# Patient Record
Sex: MALE | Race: WHITE | NOT HISPANIC OR LATINO | Employment: FULL TIME | URBAN - METROPOLITAN AREA
[De-identification: names, ages, dates, MRNs, and addresses within clinical notes are randomized per-mention and may not be internally consistent; named-entity substitution may affect disease eponyms.]

---

## 2023-10-06 PROBLEM — M31.6 GIANT CELL ARTERITIS (MULTI): Status: ACTIVE | Noted: 2023-10-06

## 2023-10-06 RX ORDER — ACETAMINOPHEN 160 MG/5ML
SUSPENSION, ORAL (FINAL DOSE FORM) ORAL
COMMUNITY
Start: 2017-12-06

## 2023-10-06 RX ORDER — AMLODIPINE BESYLATE 10 MG/1
1 TABLET ORAL DAILY
COMMUNITY
Start: 2017-01-04

## 2023-10-06 RX ORDER — OMEGA-3-ACID ETHYL ESTERS 1 G/1
1 CAPSULE, LIQUID FILLED ORAL 2 TIMES DAILY
COMMUNITY
Start: 2016-12-27

## 2023-10-06 RX ORDER — FUROSEMIDE 80 MG/1
1 TABLET ORAL DAILY
COMMUNITY
Start: 2016-09-28

## 2023-10-06 RX ORDER — LABETALOL 300 MG/1
1 TABLET, FILM COATED ORAL EVERY 12 HOURS
COMMUNITY
Start: 2016-11-07

## 2023-10-06 RX ORDER — LISINOPRIL 40 MG/1
1 TABLET ORAL 2 TIMES DAILY
COMMUNITY
Start: 2016-10-26

## 2023-10-06 RX ORDER — CLONIDINE HYDROCHLORIDE 0.3 MG/1
1 TABLET ORAL 2 TIMES DAILY
COMMUNITY
Start: 2016-12-09

## 2023-10-06 RX ORDER — CLOBETASOL PROPIONATE 0.5 MG/G
OINTMENT TOPICAL DAILY
COMMUNITY
Start: 2016-09-02

## 2023-10-06 RX ORDER — MULTIVITAMIN
TABLET ORAL
COMMUNITY

## 2023-10-06 RX ORDER — ROSUVASTATIN CALCIUM 20 MG/1
TABLET, COATED ORAL
COMMUNITY

## 2023-10-06 RX ORDER — SERTRALINE HYDROCHLORIDE 50 MG/1
1 TABLET, FILM COATED ORAL DAILY
COMMUNITY
Start: 2016-11-21

## 2023-10-06 RX ORDER — NAPROXEN SODIUM 220 MG/1
TABLET, FILM COATED ORAL
COMMUNITY
Start: 2017-12-06

## 2023-10-06 RX ORDER — FENOFIBRIC ACID 135 MG/1
1 CAPSULE, DELAYED RELEASE ORAL DAILY
COMMUNITY
Start: 2017-01-05

## 2023-10-09 ENCOUNTER — OFFICE VISIT (OUTPATIENT)
Dept: ORTHOPEDIC SURGERY | Facility: CLINIC | Age: 73
End: 2023-10-09
Payer: COMMERCIAL

## 2023-10-09 DIAGNOSIS — M54.59 DISCOGENIC LUMBAR PAIN: ICD-10-CM

## 2023-10-09 DIAGNOSIS — M47.816 LUMBAR SPONDYLOSIS: Primary | ICD-10-CM

## 2023-10-09 PROCEDURE — 99203 OFFICE O/P NEW LOW 30 MIN: CPT | Performed by: PHYSICAL MEDICINE & REHABILITATION

## 2023-10-09 PROCEDURE — 1036F TOBACCO NON-USER: CPT | Performed by: PHYSICAL MEDICINE & REHABILITATION

## 2023-10-09 PROCEDURE — 1160F RVW MEDS BY RX/DR IN RCRD: CPT | Performed by: PHYSICAL MEDICINE & REHABILITATION

## 2023-10-09 PROCEDURE — 1159F MED LIST DOCD IN RCRD: CPT | Performed by: PHYSICAL MEDICINE & REHABILITATION

## 2023-10-09 NOTE — PROGRESS NOTES
"10/9/2023     Assessment: Very pleasant 73-year-old male with acute on chronic primarily left lower back pain.  Seems to have had an exacerbation of pain after doing house projects.  Fortunately over the past couple weeks he has seen improvement.  He denies any worsening neurological deficits  -Suspect exacerbation of lumbar spondylosis and possible discogenic pain  -History of CKD-caution with medications  -History of prostate as well as renal cancer.  Patient states both are \"under control\"    PLAN:  1)  Imaging/Diagnostic Studies: Reviewed pelvic x-ray which she had on his tablet.  He deferred further imaging today.  We will obtain updated lumbar x-ray and MRI if symptoms persist or worsen  2)  Therapy/Rehabilitation: New consult provided for physical therapy  3)  Pharmacological Management: Educated over-the-counter Tylenol usage.  4)  Spine/Surgical Interventions: None at this time  5)  Alternative Treatments: May consider alternative treatment options in the future including manipulation (chiropractor versus osteopathic) and/or acupuncture if patient does not obtain optimal relief with initial treatment plan.  6)  Consultations: Physical therapy  7)  Follow -up: 4-6 weeks or PRN if symptoms worsen/do not improve.   8)  Future treatment considerations: Further imaging and medications if needed    Patient advised of the difference between hurt and harm and advised to continue with all normal activities and exercises. Patient verbalized understanding of the above plan and was happy with the care provided.      The above clinical summary has been dictated with voice recognition software. It has not been proofread for grammatical errors, typographical mistakes, or other semantic inconsistencies.    Thank you for visiting our office today. It was our pleasure to take part in your healthcare.     Do not hesitate to call with any questions regarding your plan of care after leaving at [phone]    To clinicians, " thank you very much for this kind referral. It is a privilege to partner with you in the care of your patients. My office would be delighted to assist you with any further consultations or with questions regarding the plan of care outlined. Do not hesitate to call the office or contact me directly.     Sincerely,    MERY Roa MD  , Physical Medicine and Rehabilitation, Orthopedic Spine  [facility] [employer] [facility]  [facility]         Miah Blanton   is a 73 y.o. male who presents with chronic lower back pain.  Recent exacerbation which has been improving.  Hx of CKD, prostate and renal cancer - currently in remission.  Notes hx of pelvic fracture as a child.    Location:  Main area of pain is currently at the left lower back and buttock  Radiation: Left buttock, posterior thigh and into the lower leg and ankle.  No worsening numbness or weakness.  Notes intermittent cramping.    Quality: cramp, burning   current 0/10,  at its worst 8-9/10.  Not worse than a 2-3 in the past couple weeks.    Exacerbated by forward flexion and lifting   Relieved by time, walking  Onset, traumatic event: no recent, was working in his yard building a retaining wall.    Has tried:  Tylenol, back brace, chiro care.      Valsalva sign is neg  Grocery cart sign is neg  Smoker:  no  Does not wake them at night  Litigation: no    Patient denies bowel/bladder incontinence, denies fever, denies unintentional weight loss, denies clumsiness of hands, feet, or dropping things.  Denies any constitutional or myelopathic symptomatology.      PREVIOUS TREATMENTS  IN THE LAST SIX MONTHS     Active conservative therapy  in the last six months (see below)              1. Physical therapy:  no                                                                                2. Home exercise program after PT:  no                                                    3. A physician  supervised home exercise program (HEP):  no               4. Chiropractic Care:   yes                                                                   Passive conservative therapy  in the last six months (see below)              1. NSAIDS:   advil, avoiding due to CKD                                                                                                        2. Prescription pain medication:     no                                                         3. Acupuncture:    no                                                                                         4. Tens unit: no     Assistive Devices: none    Work status: instructor at [employer]      ROS: Other than listed in HPI, PMHX below, and intake paperwork including a 30 point patient-recorded review of symptoms which was personally reviewed and inclusive of no history of unintentional weight loss, change in appetite, significant malaise, fevers, chills, or change in bowel/bladder, shortness of breath, or chest pain.    I have confirmed and edited as necessary Past Medical, Past Surgical, Family, Social History and ROS as obtained by others. These were also obtained on new patient forms.      PHYSICAL EXAM:   GENERAL APPEARANCE:  Well nourished, well developed, and no apparent distress.  NEURO PSYCH: Patient oriented to person, place, Mood pleasant. Benign affect.  MUSCULOSKELETAL and NEUROLOGICAL       VISUAL INSPECTION           LUMBAR: WNL  SPINE ROM:   LUMBAR ROM: Full with mild pain at endrange flexion and extension      PALPATION:           SPINOUS PROCESS: Nontender lumbar           PARASPINALS: Mild tenderness bilateral lower lumbar, left worse than right  FACET LOADING: Mildly positive left lower lumbar  MUSCLE BULK: Normal and symmetrical in the upper & lower extremities.  MUSCLE TONE: Normal  MOTOR: 5/5 in all muscle groups tested in bilateral lower extremities   SENSORY: Normal sensory exam to light touch  GAIT: Mildly antalgic but steady  without the use of assistive device.  Able to go up on heels and toes that weakness  REFLEXES: +1 to bilateral L extremities  STRAIGHT LEG TEST: Negative on the left  PERIPHERAL JOINT ROM:   HIP ROM: Gluteal tightness as well as hamstring tightness  DANIA/Thigh Thrust/Compression/Jessi Finger:  Negative bilaterally   Hip Exam including thigh thrust and LOG ROLL: Negative bilaterally      DATA REVIEW:   The below imaging studies were personally reviewed and discussed with the patient.    Medical Decision Making:  The above note constitutes a Moderate to High level of medical decision making based on past data and imaging review, new and chronic symptoms with exacerbation, change in weakness or sensation, new imaging and diagnostic studies ordered, discussion of potential interventional or surgical treatment options, acute or chronic pain that may pose a threat to bodily function.    No past medical history on file.    Medication Documentation Review Audit    **Prior to Admission medications have not yet been reviewed**         No Known Allergies    Social History     Socioeconomic History    Marital status:      Spouse name: Not on file    Number of children: Not on file    Years of education: Not on file    Highest education level: Not on file   Occupational History    Not on file   Tobacco Use    Smoking status: Not on file    Smokeless tobacco: Not on file   Substance and Sexual Activity    Alcohol use: Not on file    Drug use: Not on file    Sexual activity: Not on file   Other Topics Concern    Not on file   Social History Narrative    Not on file     Social Determinants of Health     Financial Resource Strain: Not on file   Food Insecurity: Not on file   Transportation Needs: Not on file   Physical Activity: Not on file   Stress: Not on file   Social Connections: Not on file   Intimate Partner Violence: Not on file   Housing Stability: Not on file       No past surgical history on file.

## 2024-09-21 ENCOUNTER — OFFICE VISIT (OUTPATIENT)
Dept: URGENT CARE | Age: 74
End: 2024-09-21
Payer: COMMERCIAL

## 2024-09-21 VITALS
HEART RATE: 69 BPM | OXYGEN SATURATION: 95 % | RESPIRATION RATE: 16 BRPM | DIASTOLIC BLOOD PRESSURE: 78 MMHG | SYSTOLIC BLOOD PRESSURE: 132 MMHG | TEMPERATURE: 99.1 F

## 2024-09-21 DIAGNOSIS — R05.9 COUGH, UNSPECIFIED TYPE: ICD-10-CM

## 2024-09-21 DIAGNOSIS — U07.1 COVID: Primary | ICD-10-CM

## 2024-09-21 LAB — POC SARS-COV-2 AG BINAX: ABNORMAL

## 2024-09-21 RX ORDER — BENZONATATE 200 MG/1
200 CAPSULE ORAL 3 TIMES DAILY PRN
Qty: 42 CAPSULE | Refills: 0 | Status: SHIPPED | OUTPATIENT
Start: 2024-09-21 | End: 2025-03-20

## 2024-09-21 RX ORDER — METHYLPREDNISOLONE 4 MG/1
TABLET ORAL
Qty: 21 TABLET | Refills: 0 | Status: SHIPPED | OUTPATIENT
Start: 2024-09-21

## 2024-09-21 ASSESSMENT — ENCOUNTER SYMPTOMS
WHEEZING: 0
CHEST TIGHTNESS: 1
SINUS PRESSURE: 0
ACTIVITY CHANGE: 1
COUGH: 1
FATIGUE: 1
SHORTNESS OF BREATH: 1
SORE THROAT: 0
FEVER: 1
CHILLS: 1

## 2024-09-21 NOTE — PATIENT INSTRUCTIONS
Adhere to CDC guidelines and stay home for at least 2-5 days and isolate from others in your home.   You are likely most infectious during these first 5 days, but new guidelines state if you are fever free w/o fever reducing medications you can go out in public with a tight fitting mask for at least 5 days   Wear a high-quality mask if you must be around others at home and in public.   Do not go places where you are unable to wear a mask. For travel guidance, see CDC’s Travel webpage.   Do not travel.   Stay home and separate from others as much as possible.   Use a separate bathroom, if possible.   Take steps to improve ventilation at home, if possible.   Don’t share personal household items, like cups, towels, and utensils.   Monitor your symptoms. If you have an emergency warning sign (like trouble breathing), seek emergency medical care immediately.     Learn more about what to do if you have COVID-19.   You do not need to retest after testing positive. Instead, you may end your isolation after five days if you feel better and are fever-free for 24 hours without using a fever-reducing medicine. You are most infectious during those five days, but the CDC advises wearing a high-quality mask through day 10.5   Still, if you decide to retest, a rapid antigen test will likely yield the most accurate results for ending your isolation.   You may need to test again within three months of a positive COVID-19 test, such as for travel. The CDC advises using a rapid antigen test instead of a PCR test if it's been less than three months since you had COVID-19.2        Managing COVID-19 symptoms        While recovering at home, keep track of your symptoms. Follow your provider's instructions and take medicines as prescribed. If you have severe symptoms, call 911 or the local emergency number.     To help manage symptoms of COVID-19, try the following tips.     Rest and drink plenty of fluids.     Acetaminophen (Tylenol) and  ibuprofen (Advil, Motrin) help reduce fever. Sometimes, providers advise you to use both types of medicine. Take the recommended amount to reduce fever. DO NOT use ibuprofen in children 6 months or younger.     A lukewarm bath or sponge bath may help cool a fever. Keep taking medicine -- otherwise your temperature might go back up.     For a sore throat, gargle several times a day with warm salt water (1/2 tsp or 3 grams of salt in 1 cup or 240 milliliters of water). Drink warm liquids such as tea, or lemon tea with honey. Suck on hard candies or throat lozenges.     Use a vaporizer or take a steamy shower to increase moisture in the air, reduce nasal congestion, and help soothe a dry throat and cough.     Saline spray can also help reduce nasal congestion.     To help relieve diarrhea, drink 8 to 10 glasses of clear liquids, such as water, diluted fruit juices, and clear soups to make up for fluid loss. Avoid dairy products, fried foods, caffeine, alcohol, and carbonated drinks.     If you have nausea, eat small meals with bland foods. Avoid foods with strong smells. Try to drink 8 to 10 glasses of water or clear fluids every day to stay hydrated.     Do not smoke, and stay away from secondhand smoke.      HOME CARE INSTRUCTIONS:     --- Drink plenty of water. Water helps thin the mucus so your sinuses can drain more easily.     --- Use a humidifier.     --- Inhale steam 3 to 4 times a day (for example, sit in the bathroom with the shower running).     --- Apply a warm, moist washcloth to your face 3 to 4 times a day, or as directed by your caregiver.     --- Take over-the-counter or prescription medicines for pain, discomfort, or fever only as directed by your caregiver.     SEEK FURTHER TREATMENT IF YOU:     --- You have increasing pain or severe headaches.     --- You have nausea, vomiting, or drowsiness.     --- You have swelling around your face.     --- You have vision problems.     --- You have a stiff neck.      --- You have difficulty breathing.

## 2024-09-21 NOTE — PROGRESS NOTES
Subjective   Patient ID: Miah Blanton is a 74 y.o. male. They present today with a chief complaint of URI (Cough, body aches and drainage X 3 days.).    History of Present Illness    URI  Presenting symptoms: congestion, cough, fatigue and fever    Presenting symptoms: no sore throat    Associated symptoms: no wheezing        Patient reports symptoms for 3 days. He has been taking Tylenol and using Robitussin as needed.  Past Medical History  Allergies as of 09/21/2024    (No Known Allergies)       (Not in a hospital admission)       No past medical history on file.    No past surgical history on file.     reports that he has never smoked. He has never used smokeless tobacco. He reports current alcohol use of about 1.0 - 2.0 standard drink of alcohol per week. He reports that he does not use drugs.    Review of Systems  Review of Systems   Constitutional:  Positive for activity change, chills, fatigue and fever.   HENT:  Positive for congestion and postnasal drip. Negative for sinus pressure and sore throat.    Respiratory:  Positive for cough, chest tightness and shortness of breath. Negative for wheezing.                                   Objective    Vitals:    09/21/24 1434   BP: 132/78   Pulse: 69   Resp: 16   Temp: 37.3 °C (99.1 °F)   SpO2: 95%     No LMP for male patient.    Physical Exam  Constitutional:       Appearance: Normal appearance.   HENT:      Head: Normocephalic.      Right Ear: Tympanic membrane, ear canal and external ear normal.      Left Ear: Tympanic membrane, ear canal and external ear normal.      Nose: Congestion present.      Mouth/Throat:      Mouth: Mucous membranes are dry.   Eyes:      Conjunctiva/sclera: Conjunctivae normal.   Cardiovascular:      Rate and Rhythm: Normal rate and regular rhythm.      Heart sounds: Normal heart sounds.   Pulmonary:      Effort: Pulmonary effort is normal.      Breath sounds: Normal breath sounds.   Musculoskeletal:      Cervical back: Neck supple.    Skin:     General: Skin is warm and dry.   Neurological:      Mental Status: He is alert.         Procedures    Point of Care Test & Imaging Results from this visit  Results for orders placed or performed in visit on 09/21/24   POCT Covid-19 Rapid Antigen   Result Value Ref Range    POC DARWIN-COV-2 AG Positive test for SARS-CoV-2 (antigen detected) (A) Presumptive negative test for SARS-CoV-2 (no antigen detected)      No results found.    Diagnostic study results (if any) were reviewed by JUSTIN Brady.    Assessment/Plan   Allergies, medications, history, and pertinent labs/EKGs/Imaging reviewed by JUSTIN Brady.     Medical Decision Making  Rapid COVID- POSITIVE     Adhere to CDC guidelines and stay home for at least 2-5 days and isolate from others in your home.   You are likely most infectious during these first 5 days, but new guidelines state if you are fever free w/o fever reducing medications you can go out in public with a tight fitting mask for at least 5 days   Wear a high-quality mask if you must be around others at home and in public.   Do not go places where you are unable to wear a mask. For travel guidance, see CDC’s Travel webpage.   Do not travel.   Stay home and separate from others as much as possible.   Use a separate bathroom, if possible.   Take steps to improve ventilation at home, if possible.   Don’t share personal household items, like cups, towels, and utensils.   Monitor your symptoms. If you have an emergency warning sign (like trouble breathing), seek emergency medical care immediately.     Learn more about what to do if you have COVID-19.   You do not need to retest after testing positive. Instead, you may end your isolation after five days if you feel better and are fever-free for 24 hours without using a fever-reducing medicine. You are most infectious during those five days, but the CDC advises wearing a high-quality mask through day 10.5   Still, if  you decide to retest, a rapid antigen test will likely yield the most accurate results for ending your isolation.   You may need to test again within three months of a positive COVID-19 test, such as for travel. The CDC advises using a rapid antigen test instead of a PCR test if it's been less than three months since you had COVID-19.2        Managing COVID-19 symptoms        While recovering at home, keep track of your symptoms. Follow your provider's instructions and take medicines as prescribed. If you have severe symptoms, call 911 or the local emergency number.     To help manage symptoms of COVID-19, try the following tips.     Rest and drink plenty of fluids.     Acetaminophen (Tylenol) and ibuprofen (Advil, Motrin) help reduce fever. Sometimes, providers advise you to use both types of medicine. Take the recommended amount to reduce fever. DO NOT use ibuprofen in children 6 months or younger.     A lukewarm bath or sponge bath may help cool a fever. Keep taking medicine -- otherwise your temperature might go back up.     For a sore throat, gargle several times a day with warm salt water (1/2 tsp or 3 grams of salt in 1 cup or 240 milliliters of water). Drink warm liquids such as tea, or lemon tea with honey. Suck on hard candies or throat lozenges.     Use a vaporizer or take a steamy shower to increase moisture in the air, reduce nasal congestion, and help soothe a dry throat and cough.     Saline spray can also help reduce nasal congestion.     To help relieve diarrhea, drink 8 to 10 glasses of clear liquids, such as water, diluted fruit juices, and clear soups to make up for fluid loss. Avoid dairy products, fried foods, caffeine, alcohol, and carbonated drinks.     If you have nausea, eat small meals with bland foods. Avoid foods with strong smells. Try to drink 8 to 10 glasses of water or clear fluids every day to stay hydrated.     Do not smoke, and stay away from secondhand smoke.      HOME CARE  INSTRUCTIONS:     --- Drink plenty of water. Water helps thin the mucus so your sinuses can drain more easily.     --- Use a humidifier.     --- Inhale steam 3 to 4 times a day (for example, sit in the bathroom with the shower running).     --- Apply a warm, moist washcloth to your face 3 to 4 times a day, or as directed by your caregiver.     --- Take over-the-counter or prescription medicines for pain, discomfort, or fever only as directed by your caregiver.     SEEK FURTHER TREATMENT IF YOU:     --- You have increasing pain or severe headaches.     --- You have nausea, vomiting, or drowsiness.     --- You have swelling around your face.     --- You have vision problems.     --- You have a stiff neck.     --- You have difficulty breathing.     Orders and Diagnoses  Diagnoses and all orders for this visit:  COVID  -     methylPREDNISolone (Medrol Dospak) 4 mg tablets; Take as directed on package.  -     benzonatate (Tessalon) 200 mg capsule; Take 1 capsule (200 mg) by mouth 3 times a day as needed for cough. Do not crush or chew.  Cough, unspecified type  -     POCT Covid-19 Rapid Antigen  -     benzonatate (Tessalon) 200 mg capsule; Take 1 capsule (200 mg) by mouth 3 times a day as needed for cough. Do not crush or chew.      Medical Admin Record      Patient disposition: Home    Electronically signed by JUSTIN Brady  2:51 PM